# Patient Record
Sex: FEMALE | Race: WHITE
[De-identification: names, ages, dates, MRNs, and addresses within clinical notes are randomized per-mention and may not be internally consistent; named-entity substitution may affect disease eponyms.]

---

## 2017-09-15 ENCOUNTER — HOSPITAL ENCOUNTER (OUTPATIENT)
Dept: HOSPITAL 53 - M RAD | Age: 82
End: 2017-09-15
Attending: SURGERY
Payer: MEDICARE

## 2017-09-15 DIAGNOSIS — I65.23: Primary | ICD-10-CM

## 2017-09-15 NOTE — REP
CAROTID DUPLEX ULTRASOUND:  09/15/2017

 

COMPARISON.    07/03/2012

 

CLINICAL HISTORY:  Carotid stenosis.

 

Standard duplex techniques were utilized to evaluate the carotid systems

bilaterally.  There is only minimal intimal thickening in the right common

carotid artery and some anterior and posterior plaque at the bulb and into the

proximal ICA and ECA noted, but mild.

 

The left common carotid also shows some intimal thickening with anterior and

posterior plaque at the bulb and extending into the ICA and ECA with both

calcific and soft plaque noted.

 

Peak Velocities:

 

                                 RIGHT          LEFT

 

CCA systolic         0.86               0.85 m/s

 

ICA systolic          0.95                0.60 m/s

 

ICA diastolic        0.25                0.18 m/s

 

ECA systolic           1.05               0.94 m/s

 

IC/CC ratio           1.10               0.70.

 

Cranial direction of flow is seen in both vertebral arteries.  The Doppler

waveform analysis shows no significant spectral broadening or filling in of the

systolic window.

 

IMPRESSION:

 

1.  Bilateral moderate carotid disease, less than 50% stenosis in the proximal

right and left ICA.  Soft and calcific plaque identified at the bulb, proximal

ICA.  Significant progression.  No hemodynamically significant or flow

restricting lesion.

 

2.  Cranial direction of flow vertebral arteries.

 

 

Signed by

José Torrez MD 09/15/2017 03:50 P

## 2017-09-21 ENCOUNTER — HOSPITAL ENCOUNTER (OUTPATIENT)
Dept: HOSPITAL 53 - M OPP | Age: 82
Discharge: HOME | End: 2017-09-21
Attending: SURGERY
Payer: MEDICARE

## 2017-09-21 VITALS — BODY MASS INDEX: 27 KG/M2 | WEIGHT: 168 LBS | HEIGHT: 66 IN

## 2017-09-21 VITALS — SYSTOLIC BLOOD PRESSURE: 135 MMHG | DIASTOLIC BLOOD PRESSURE: 67 MMHG

## 2017-09-21 DIAGNOSIS — G47.00: ICD-10-CM

## 2017-09-21 DIAGNOSIS — Z96.652: ICD-10-CM

## 2017-09-21 DIAGNOSIS — K64.4: ICD-10-CM

## 2017-09-21 DIAGNOSIS — M19.90: ICD-10-CM

## 2017-09-21 DIAGNOSIS — D12.5: ICD-10-CM

## 2017-09-21 DIAGNOSIS — Z12.11: Primary | ICD-10-CM

## 2017-09-21 DIAGNOSIS — J45.909: ICD-10-CM

## 2017-09-21 DIAGNOSIS — I65.23: ICD-10-CM

## 2017-09-21 DIAGNOSIS — M35.00: ICD-10-CM

## 2017-09-21 DIAGNOSIS — K57.30: ICD-10-CM

## 2017-09-21 DIAGNOSIS — Z80.1: ICD-10-CM

## 2017-09-21 DIAGNOSIS — Z79.899: ICD-10-CM

## 2017-09-21 DIAGNOSIS — D12.4: ICD-10-CM

## 2017-09-21 DIAGNOSIS — K64.8: ICD-10-CM

## 2017-09-21 DIAGNOSIS — I10: ICD-10-CM

## 2017-09-21 DIAGNOSIS — D12.3: ICD-10-CM

## 2017-09-21 DIAGNOSIS — Z80.42: ICD-10-CM

## 2017-09-21 DIAGNOSIS — Z80.52: ICD-10-CM

## 2017-09-21 DIAGNOSIS — R21: ICD-10-CM

## 2017-09-21 DIAGNOSIS — E78.5: ICD-10-CM

## 2017-09-21 DIAGNOSIS — Z80.0: ICD-10-CM

## 2017-09-21 DIAGNOSIS — Z86.010: ICD-10-CM

## 2017-09-21 NOTE — ROOR
________________________________________________________________________________

Patient Name: Abby Heard             Procedure Date: 9/21/2017 11:38 AM

MRN: [id number]                          Account Number: [id number]

YOB: 1935               Age: 82

Room: [facility]                            Gender: Female

Note Status: Finalized                 

________________________________________________________________________________

 

Procedure:           Colonoscopy

Indications:         High risk colon cancer surveillance: Personal history of 

                     colonic polyps

Providers:           Leo J. Gosselin Jr, MD

Referring MD:        REMY ROBISON MD

Requesting Provider: 

Medicines:           Propofol per Anesthesia

Complications:       No immediate complications.

________________________________________________________________________________

Procedure:           Pre-Anesthesia Assessment:

                     - Prior to the procedure, a History and Physical was 

                     performed, and patient medications and allergies were 

                     reviewed. The patient is competent. The risks and 

                     benefits of the procedure and the sedation options and 

                     risks were discussed with the patient. All questions were 

                     answered and informed consent was obtained. Patient 

                     identification and proposed procedure were verified by 

                     the physician and the nurse in the pre-procedure area and 

                     in the procedure room. Mental Status Examination: alert 

                     and oriented. Airway Examination: normal oropharyngeal 

                     airway and neck mobility. Respiratory Examination: clear 

                     to auscultation. CV Examination: normal. ASA Grade 

                     Assessment: II - A patient with mild systemic disease. 

                     After reviewing the risks and benefits, the patient was 

                     deemed in satisfactory condition to undergo the 

                     procedure. The anesthesia plan was to use moderate 

                     sedation / analgesia (conscious sedation). Immediately 

                     prior to administration of medications, the patient was 

                     re-assessed for adequacy to receive sedatives. The heart 

                     rate, respiratory rate, oxygen saturations, blood 

                     pressure, adequacy of pulmonary ventilation, and response 

                     to care were monitored throughout the procedure. The 

                     physical status of the patient was re-assessed after the 

                     procedure.

                     The Colonoscope was introduced through the anus and 

                     advanced to the cecum, identified by appendiceal orifice 

                     and ileocecal valve. The colonoscopy was performed 

                     without difficulty. The patient tolerated the procedure 

                     well. The quality of the bowel preparation was fair.

                                                                                

Findings:

     The perianal exam findings include non-thrombosed external hemorrhoids 

     and non-thrombosed internal hemorrhoids.

     Many small and large-mouthed diverticula were found in the sigmoid colon.

     Three sessile polyps were found in the sigmoid colon, descending colon 

     and transverse colon. The polyps were small in size.

     The rectum, recto-sigmoid colon, ascending colon and cecum appeared 

     normal.

                                                                                

Impression:          - Preparation of the colon was fair.

                     - Non-thrombosed external hemorrhoids and non-thrombosed 

                     internal hemorrhoids found on perianal exam.

                     - Diverticulosis in the sigmoid colon.

                     - Three small polyps in the sigmoid colon, in the 

                     descending colon and in the transverse colon.

                     - The rectum, recto-sigmoid colon, ascending colon and 

                     cecum are normal.

                     - No specimens collected.

Recommendation:      - Discharge patient to home (ambulatory).

                     - Repeat colonoscopy in 5 years for surveillance.

                                                                                

 

Leo Gosselin MD

_____________________

Leo J Gosselin Jr, MD

9/21/2017 12:11:11 PM

This report has been signed electronically.

Number of Addenda: 0

 

Note Initiated On: 9/21/2017 11:38 AM

Estimated Blood Loss:

     Estimated blood loss: none.

## 2020-08-21 ENCOUNTER — HOSPITAL ENCOUNTER (OUTPATIENT)
Dept: HOSPITAL 53 - M RAD | Age: 85
End: 2020-08-21
Attending: INTERNAL MEDICINE
Payer: MEDICARE

## 2020-08-21 DIAGNOSIS — I65.8: Primary | ICD-10-CM

## 2020-08-28 NOTE — REP
BILATERAL CAROTID DUPLEX ULTRASOUND 

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



FINDINGS: 

There is moderate atheromatous plaque in the bulbs bilaterally. 



 





PEAK FLOW VELOCITY ANALYSIS RIGHT LEFT

 

Peak ICA velocity  83.8 cm/s 75.1 cm/s

 

Diastolic ICA velocity  20.6 cm/s  18.6 cm/s 

 

ICA/CCA ratio 0.87 0.93

 

Peak ECA flow velocity  101.4 cm/s  82.6 cm/s 

 

Peak CCA flow velocity  96.3 cm/s  80.8 cm/s 





 Peak flow velocities are normal bilaterally. There is no significant stenosis 
on the right or the left. 



There is antegrade flow in the vertebral arteries bilaterally. 

MTDD

## 2022-05-10 ENCOUNTER — HOSPITAL ENCOUNTER (INPATIENT)
Dept: HOSPITAL 53 - M ED | Age: 87
LOS: 3 days | Discharge: HOME HEALTH | DRG: 689 | End: 2022-05-13
Attending: INTERNAL MEDICINE | Admitting: INTERNAL MEDICINE
Payer: MEDICARE

## 2022-05-10 VITALS — BODY MASS INDEX: 23.56 KG/M2 | HEIGHT: 66 IN | WEIGHT: 146.61 LBS

## 2022-05-10 DIAGNOSIS — Z79.899: ICD-10-CM

## 2022-05-10 DIAGNOSIS — M32.9: ICD-10-CM

## 2022-05-10 DIAGNOSIS — B96.20: ICD-10-CM

## 2022-05-10 DIAGNOSIS — Z79.82: ICD-10-CM

## 2022-05-10 DIAGNOSIS — J09.X2: ICD-10-CM

## 2022-05-10 DIAGNOSIS — Z88.2: ICD-10-CM

## 2022-05-10 DIAGNOSIS — E78.5: ICD-10-CM

## 2022-05-10 DIAGNOSIS — K21.9: ICD-10-CM

## 2022-05-10 DIAGNOSIS — Z88.1: ICD-10-CM

## 2022-05-10 DIAGNOSIS — N39.0: Primary | ICD-10-CM

## 2022-05-10 DIAGNOSIS — I10: ICD-10-CM

## 2022-05-10 DIAGNOSIS — G93.41: ICD-10-CM

## 2022-05-10 DIAGNOSIS — Z20.822: ICD-10-CM

## 2022-05-10 DIAGNOSIS — E87.6: ICD-10-CM

## 2022-05-10 DIAGNOSIS — J45.909: ICD-10-CM

## 2022-05-10 DIAGNOSIS — Z90.49: ICD-10-CM

## 2022-05-10 DIAGNOSIS — E83.42: ICD-10-CM

## 2022-05-10 DIAGNOSIS — M35.00: ICD-10-CM

## 2022-05-10 DIAGNOSIS — Z96.652: ICD-10-CM

## 2022-05-10 DIAGNOSIS — Z66: ICD-10-CM

## 2022-05-10 DIAGNOSIS — Z90.79: ICD-10-CM

## 2022-05-10 DIAGNOSIS — R50.9: ICD-10-CM

## 2022-05-10 LAB
ALBUMIN SERPL BCG-MCNC: 2.7 GM/DL (ref 3.2–5.2)
ALT SERPL W P-5'-P-CCNC: 19 U/L (ref 12–78)
BASOPHILS # BLD AUTO: 0 10^3/UL (ref 0–0.2)
BASOPHILS NFR BLD AUTO: 0.1 % (ref 0–1)
BILIRUB CONJ SERPL-MCNC: 0.1 MG/DL (ref 0–0.2)
BILIRUB SERPL-MCNC: 0.3 MG/DL (ref 0.2–1)
BUN SERPL-MCNC: 15 MG/DL (ref 7–18)
CALCIUM SERPL-MCNC: 8 MG/DL (ref 8.8–10.2)
CHLORIDE SERPL-SCNC: 101 MEQ/L (ref 98–107)
CO2 SERPL-SCNC: 29 MEQ/L (ref 21–32)
CREAT SERPL-MCNC: 1.05 MG/DL (ref 0.55–1.3)
EOSINOPHIL # BLD AUTO: 0 10^3/UL (ref 0–0.5)
EOSINOPHIL NFR BLD AUTO: 0.3 % (ref 0–3)
GFR SERPL CREATININE-BSD FRML MDRD: 53 ML/MIN/{1.73_M2} (ref 32–?)
GLUCOSE SERPL-MCNC: 118 MG/DL (ref 70–100)
HCT VFR BLD AUTO: 31.2 % (ref 36–47)
HGB BLD-MCNC: 10.7 G/DL (ref 12–15.5)
LIPASE SERPL-CCNC: 64 U/L (ref 73–393)
LYMPHOCYTES # BLD AUTO: 0.4 10^3/UL (ref 1.5–5)
LYMPHOCYTES NFR BLD AUTO: 4.2 % (ref 24–44)
MCH RBC QN AUTO: 30.7 PG (ref 27–33)
MCHC RBC AUTO-ENTMCNC: 34.3 G/DL (ref 32–36.5)
MCV RBC AUTO: 89.4 FL (ref 80–96)
MONOCYTES # BLD AUTO: 0.7 10^3/UL (ref 0–0.8)
MONOCYTES NFR BLD AUTO: 7.5 % (ref 2–8)
NEUTROPHILS # BLD AUTO: 8.1 10^3/UL (ref 1.5–8.5)
NEUTROPHILS NFR BLD AUTO: 87.4 % (ref 36–66)
PLATELET # BLD AUTO: 188 10^3/UL (ref 150–450)
POTASSIUM SERPL-SCNC: 2.9 MEQ/L (ref 3.5–5.1)
PROT SERPL-MCNC: 6.3 GM/DL (ref 6.4–8.2)
RBC # BLD AUTO: 3.49 10^6/UL (ref 4–5.4)
SODIUM SERPL-SCNC: 135 MEQ/L (ref 136–145)
WBC # BLD AUTO: 9.3 10^3/UL (ref 4–10)

## 2022-05-11 VITALS — OXYGEN SATURATION: 96 %

## 2022-05-11 VITALS — SYSTOLIC BLOOD PRESSURE: 88 MMHG | DIASTOLIC BLOOD PRESSURE: 51 MMHG

## 2022-05-11 VITALS — OXYGEN SATURATION: 95 %

## 2022-05-11 VITALS — OXYGEN SATURATION: 97 % | DIASTOLIC BLOOD PRESSURE: 41 MMHG | SYSTOLIC BLOOD PRESSURE: 91 MMHG

## 2022-05-11 VITALS — DIASTOLIC BLOOD PRESSURE: 62 MMHG | SYSTOLIC BLOOD PRESSURE: 132 MMHG

## 2022-05-11 VITALS — OXYGEN SATURATION: 98 % | DIASTOLIC BLOOD PRESSURE: 52 MMHG | SYSTOLIC BLOOD PRESSURE: 110 MMHG

## 2022-05-11 VITALS — OXYGEN SATURATION: 94 %

## 2022-05-11 VITALS — SYSTOLIC BLOOD PRESSURE: 98 MMHG | DIASTOLIC BLOOD PRESSURE: 50 MMHG

## 2022-05-11 VITALS — OXYGEN SATURATION: 93 %

## 2022-05-11 VITALS — SYSTOLIC BLOOD PRESSURE: 140 MMHG | DIASTOLIC BLOOD PRESSURE: 60 MMHG

## 2022-05-11 VITALS — SYSTOLIC BLOOD PRESSURE: 131 MMHG | OXYGEN SATURATION: 94 % | DIASTOLIC BLOOD PRESSURE: 58 MMHG

## 2022-05-11 VITALS — DIASTOLIC BLOOD PRESSURE: 65 MMHG | SYSTOLIC BLOOD PRESSURE: 106 MMHG

## 2022-05-11 VITALS — OXYGEN SATURATION: 97 %

## 2022-05-11 LAB
APTT BLD: 32.2 SECONDS (ref 25.9–37)
BUN SERPL-MCNC: 19 MG/DL (ref 7–18)
CALCIUM SERPL-MCNC: 8.1 MG/DL (ref 8.8–10.2)
CHLORIDE SERPL-SCNC: 101 MEQ/L (ref 98–107)
CO2 SERPL-SCNC: 26 MEQ/L (ref 21–32)
CREAT SERPL-MCNC: 1.17 MG/DL (ref 0.55–1.3)
GFR SERPL CREATININE-BSD FRML MDRD: 46.8 ML/MIN/{1.73_M2} (ref 32–?)
GLUCOSE SERPL-MCNC: 150 MG/DL (ref 70–100)
HCT VFR BLD AUTO: 36.2 % (ref 36–47)
HGB BLD-MCNC: 12.4 G/DL (ref 12–15.5)
INR PPP: 0.99
MAGNESIUM SERPL-MCNC: 2.1 MG/DL (ref 1.8–2.4)
MCH RBC QN AUTO: 31 PG (ref 27–33)
MCHC RBC AUTO-ENTMCNC: 34.3 G/DL (ref 32–36.5)
MCV RBC AUTO: 90.5 FL (ref 80–96)
PLATELET # BLD AUTO: 219 10^3/UL (ref 150–450)
POTASSIUM SERPL-SCNC: 3.3 MEQ/L (ref 3.5–5.1)
PROTHROMBIN TIME: 13.5 SECONDS (ref 12.7–14.5)
RBC # BLD AUTO: 4 10^6/UL (ref 4–5.4)
SODIUM SERPL-SCNC: 135 MEQ/L (ref 136–145)
WBC # BLD AUTO: 12.1 10^3/UL (ref 4–10)

## 2022-05-11 RX ADMIN — FLUTICASONE PROPIONATE AND SALMETEROL XINAFOATE SCH PUFF: 115; 21 AEROSOL, METERED RESPIRATORY (INHALATION) at 19:33

## 2022-05-11 RX ADMIN — ENOXAPARIN SODIUM SCH MG: 40 INJECTION SUBCUTANEOUS at 10:18

## 2022-05-11 RX ADMIN — CEFTRIAXONE SCH MLS/HR: 1 INJECTION, POWDER, FOR SOLUTION INTRAMUSCULAR; INTRAVENOUS at 20:34

## 2022-05-11 RX ADMIN — POTASSIUM CHLORIDE AND SODIUM CHLORIDE SCH MLS/HR: 900; 300 INJECTION, SOLUTION INTRAVENOUS at 01:43

## 2022-05-11 RX ADMIN — ACETAMINOPHEN PRN MG: 325 TABLET ORAL at 15:53

## 2022-05-11 RX ADMIN — ASPIRIN SCH MG: 81 TABLET ORAL at 20:34

## 2022-05-11 RX ADMIN — OSELTAMIVIR PHOSPHATE SCH MG: 30 CAPSULE ORAL at 08:00

## 2022-05-11 RX ADMIN — SIMVASTATIN SCH MG: 20 TABLET, FILM COATED ORAL at 20:34

## 2022-05-11 RX ADMIN — OSELTAMIVIR PHOSPHATE SCH MG: 30 CAPSULE ORAL at 20:34

## 2022-05-11 RX ADMIN — POTASSIUM CHLORIDE AND SODIUM CHLORIDE SCH MLS/HR: 900; 300 INJECTION, SOLUTION INTRAVENOUS at 10:18

## 2022-05-11 RX ADMIN — FLUTICASONE PROPIONATE AND SALMETEROL XINAFOATE SCH PUFF: 115; 21 AEROSOL, METERED RESPIRATORY (INHALATION) at 18:13

## 2022-05-12 VITALS — OXYGEN SATURATION: 92 %

## 2022-05-12 VITALS — SYSTOLIC BLOOD PRESSURE: 129 MMHG | DIASTOLIC BLOOD PRESSURE: 59 MMHG

## 2022-05-12 VITALS — OXYGEN SATURATION: 95 % | SYSTOLIC BLOOD PRESSURE: 120 MMHG | DIASTOLIC BLOOD PRESSURE: 59 MMHG

## 2022-05-12 VITALS — SYSTOLIC BLOOD PRESSURE: 119 MMHG | DIASTOLIC BLOOD PRESSURE: 69 MMHG

## 2022-05-12 VITALS — OXYGEN SATURATION: 95 %

## 2022-05-12 VITALS — DIASTOLIC BLOOD PRESSURE: 59 MMHG | SYSTOLIC BLOOD PRESSURE: 120 MMHG

## 2022-05-12 VITALS — DIASTOLIC BLOOD PRESSURE: 60 MMHG | SYSTOLIC BLOOD PRESSURE: 130 MMHG

## 2022-05-12 VITALS — OXYGEN SATURATION: 96 %

## 2022-05-12 LAB
BUN SERPL-MCNC: 15 MG/DL (ref 7–18)
CALCIUM SERPL-MCNC: 7.5 MG/DL (ref 8.8–10.2)
CHLORIDE SERPL-SCNC: 109 MEQ/L (ref 98–107)
CO2 SERPL-SCNC: 25 MEQ/L (ref 21–32)
CREAT SERPL-MCNC: 0.89 MG/DL (ref 0.55–1.3)
GFR SERPL CREATININE-BSD FRML MDRD: > 60 ML/MIN/{1.73_M2} (ref 32–?)
GLUCOSE SERPL-MCNC: 98 MG/DL (ref 70–100)
HCT VFR BLD AUTO: 29.2 % (ref 36–47)
HGB BLD-MCNC: 10 G/DL (ref 12–15.5)
MAGNESIUM SERPL-MCNC: 1.7 MG/DL (ref 1.8–2.4)
MCH RBC QN AUTO: 29.8 PG (ref 27–33)
MCHC RBC AUTO-ENTMCNC: 34.2 G/DL (ref 32–36.5)
MCV RBC AUTO: 86.9 FL (ref 80–96)
PLATELET # BLD AUTO: 181 10^3/UL (ref 150–450)
POTASSIUM SERPL-SCNC: 3.1 MEQ/L (ref 3.5–5.1)
RBC # BLD AUTO: 3.36 10^6/UL (ref 4–5.4)
SODIUM SERPL-SCNC: 140 MEQ/L (ref 136–145)
WBC # BLD AUTO: 11.7 10^3/UL (ref 4–10)

## 2022-05-12 RX ADMIN — OMEPRAZOLE SCH MG: 20 CAPSULE, DELAYED RELEASE ORAL at 08:52

## 2022-05-12 RX ADMIN — ASPIRIN SCH MG: 81 TABLET ORAL at 20:06

## 2022-05-12 RX ADMIN — LORATADINE SCH MG: 10 TABLET ORAL at 08:52

## 2022-05-12 RX ADMIN — ENOXAPARIN SODIUM SCH MG: 40 INJECTION SUBCUTANEOUS at 08:52

## 2022-05-12 RX ADMIN — SIMVASTATIN SCH MG: 20 TABLET, FILM COATED ORAL at 20:06

## 2022-05-12 RX ADMIN — FLUTICASONE PROPIONATE AND SALMETEROL XINAFOATE SCH PUFF: 115; 21 AEROSOL, METERED RESPIRATORY (INHALATION) at 07:38

## 2022-05-12 RX ADMIN — ACETAMINOPHEN PRN MG: 325 TABLET ORAL at 16:16

## 2022-05-12 RX ADMIN — FLUTICASONE PROPIONATE AND SALMETEROL XINAFOATE SCH PUFF: 115; 21 AEROSOL, METERED RESPIRATORY (INHALATION) at 19:14

## 2022-05-12 RX ADMIN — ACETAMINOPHEN PRN MG: 325 TABLET ORAL at 02:44

## 2022-05-12 RX ADMIN — METOPROLOL SUCCINATE SCH MG: 25 TABLET, EXTENDED RELEASE ORAL at 08:46

## 2022-05-12 RX ADMIN — OSELTAMIVIR PHOSPHATE SCH MG: 30 CAPSULE ORAL at 20:06

## 2022-05-12 RX ADMIN — OSELTAMIVIR PHOSPHATE SCH MG: 30 CAPSULE ORAL at 08:52

## 2022-05-12 RX ADMIN — ACETAMINOPHEN PRN MG: 325 TABLET ORAL at 07:41

## 2022-05-12 RX ADMIN — CEFTRIAXONE SCH MLS/HR: 1 INJECTION, POWDER, FOR SOLUTION INTRAMUSCULAR; INTRAVENOUS at 20:06

## 2022-05-13 VITALS — DIASTOLIC BLOOD PRESSURE: 61 MMHG | SYSTOLIC BLOOD PRESSURE: 128 MMHG

## 2022-05-13 VITALS — DIASTOLIC BLOOD PRESSURE: 60 MMHG | SYSTOLIC BLOOD PRESSURE: 134 MMHG

## 2022-05-13 LAB
BUN SERPL-MCNC: 8 MG/DL (ref 7–18)
CALCIUM SERPL-MCNC: 7.6 MG/DL (ref 8.8–10.2)
CHLORIDE SERPL-SCNC: 110 MEQ/L (ref 98–107)
CO2 SERPL-SCNC: 26 MEQ/L (ref 21–32)
CREAT SERPL-MCNC: 0.74 MG/DL (ref 0.55–1.3)
GFR SERPL CREATININE-BSD FRML MDRD: > 60 ML/MIN/{1.73_M2} (ref 32–?)
GLUCOSE SERPL-MCNC: 96 MG/DL (ref 70–100)
HCT VFR BLD AUTO: 30.5 % (ref 36–47)
HGB BLD-MCNC: 10.4 G/DL (ref 12–15.5)
MAGNESIUM SERPL-MCNC: 2 MG/DL (ref 1.8–2.4)
MCH RBC QN AUTO: 30.5 PG (ref 27–33)
MCHC RBC AUTO-ENTMCNC: 34.1 G/DL (ref 32–36.5)
MCV RBC AUTO: 89.4 FL (ref 80–96)
PLATELET # BLD AUTO: 193 10^3/UL (ref 150–450)
POTASSIUM SERPL-SCNC: 3.3 MEQ/L (ref 3.5–5.1)
RBC # BLD AUTO: 3.41 10^6/UL (ref 4–5.4)
SODIUM SERPL-SCNC: 140 MEQ/L (ref 136–145)
WBC # BLD AUTO: 6.5 10^3/UL (ref 4–10)

## 2022-05-13 RX ADMIN — ENOXAPARIN SODIUM SCH MG: 40 INJECTION SUBCUTANEOUS at 08:14

## 2022-05-13 RX ADMIN — ACETAMINOPHEN PRN MG: 325 TABLET ORAL at 01:20

## 2022-05-13 RX ADMIN — FLUTICASONE PROPIONATE AND SALMETEROL XINAFOATE SCH PUFF: 115; 21 AEROSOL, METERED RESPIRATORY (INHALATION) at 07:47

## 2022-05-13 RX ADMIN — LORATADINE SCH MG: 10 TABLET ORAL at 08:13

## 2022-05-13 RX ADMIN — OSELTAMIVIR PHOSPHATE SCH MG: 30 CAPSULE ORAL at 08:13

## 2022-05-13 RX ADMIN — METOPROLOL SUCCINATE SCH MG: 25 TABLET, EXTENDED RELEASE ORAL at 08:13

## 2022-05-13 RX ADMIN — OMEPRAZOLE SCH MG: 20 CAPSULE, DELAYED RELEASE ORAL at 08:13

## 2022-06-17 ENCOUNTER — HOSPITAL ENCOUNTER (OUTPATIENT)
Dept: HOSPITAL 53 - M PLALAB | Age: 87
End: 2022-06-17
Attending: INTERNAL MEDICINE
Payer: MEDICARE

## 2022-06-17 DIAGNOSIS — M35.01: Primary | ICD-10-CM

## 2022-06-17 LAB
BASOPHILS # BLD AUTO: 0 10^3/UL (ref 0–0.2)
BASOPHILS NFR BLD AUTO: 0.2 % (ref 0–1)
EOSINOPHIL # BLD AUTO: 0.2 10^3/UL (ref 0–0.5)
EOSINOPHIL NFR BLD AUTO: 2.2 % (ref 0–3)
HCT VFR BLD AUTO: 37 % (ref 36–47)
HGB BLD-MCNC: 12.5 G/DL (ref 12–15.5)
LYMPHOCYTES # BLD AUTO: 1.2 10^3/UL (ref 1.5–5)
LYMPHOCYTES NFR BLD AUTO: 13.3 % (ref 24–44)
MCH RBC QN AUTO: 31.1 PG (ref 27–33)
MCHC RBC AUTO-ENTMCNC: 33.8 G/DL (ref 32–36.5)
MCV RBC AUTO: 92 FL (ref 80–96)
MONOCYTES # BLD AUTO: 1.2 10^3/UL (ref 0–0.8)
MONOCYTES NFR BLD AUTO: 13.7 % (ref 2–8)
NEUTROPHILS # BLD AUTO: 6.3 10^3/UL (ref 1.5–8.5)
NEUTROPHILS NFR BLD AUTO: 70 % (ref 36–66)
PLATELET # BLD AUTO: 334 10^3/UL (ref 150–450)
RBC # BLD AUTO: 4.02 10^6/UL (ref 4–5.4)
WBC # BLD AUTO: 9 10^3/UL (ref 4–10)

## 2023-08-15 ENCOUNTER — HOSPITAL ENCOUNTER (OUTPATIENT)
Dept: HOSPITAL 53 - M WUC | Age: 88
End: 2023-08-15
Attending: INTERNAL MEDICINE
Payer: MEDICARE

## 2023-08-15 DIAGNOSIS — M32.9: Primary | ICD-10-CM

## 2023-08-15 LAB
ALBUMIN SERPL BCG-MCNC: 3.3 G/DL (ref 3.2–5.2)
ALP SERPL-CCNC: 91 U/L (ref 46–116)
ALT SERPL W P-5'-P-CCNC: 15 U/L (ref 7–40)
AST SERPL-CCNC: 15 U/L (ref ?–34)
BASOPHILS # BLD AUTO: 0 10^3/UL (ref 0–0.2)
BASOPHILS NFR BLD AUTO: 0.1 % (ref 0–1)
BILIRUB SERPL-MCNC: 0.7 MG/DL (ref 0.3–1.2)
BUN SERPL-MCNC: 18 MG/DL (ref 9–23)
CALCIUM SERPL-MCNC: 9.1 MG/DL (ref 8.3–10.6)
CHLORIDE SERPL-SCNC: 102 MMOL/L (ref 98–107)
CO2 SERPL-SCNC: 31 MMOL/L (ref 20–31)
CREAT SERPL-MCNC: 0.9 MG/DL (ref 0.55–1.3)
EOSINOPHIL # BLD AUTO: 0.2 10^3/UL (ref 0–0.5)
EOSINOPHIL NFR BLD AUTO: 2.7 % (ref 0–3)
GFR SERPL CREATININE-BSD FRML MDRD: > 60 ML/MIN/{1.73_M2} (ref 32–?)
GLUCOSE SERPL-MCNC: 97 MG/DL (ref 74–106)
HCT VFR BLD AUTO: 36.1 % (ref 36–47)
HGB BLD-MCNC: 12 G/DL (ref 12–15.5)
LYMPHOCYTES # BLD AUTO: 1.5 10^3/UL (ref 1.5–5)
LYMPHOCYTES NFR BLD AUTO: 19.6 % (ref 24–44)
MCH RBC QN AUTO: 32.1 PG (ref 27–33)
MCHC RBC AUTO-ENTMCNC: 33.2 G/DL (ref 32–36.5)
MCV RBC AUTO: 96.5 FL (ref 80–96)
MONOCYTES # BLD AUTO: 1.2 10^3/UL (ref 0–0.8)
MONOCYTES NFR BLD AUTO: 15.8 % (ref 2–8)
NEUTROPHILS # BLD AUTO: 4.6 10^3/UL (ref 1.5–8.5)
NEUTROPHILS NFR BLD AUTO: 61.5 % (ref 36–66)
PLATELET # BLD AUTO: 245 10^3/UL (ref 150–450)
POTASSIUM SERPL-SCNC: 3.3 MMOL/L (ref 3.5–5.1)
PROT SERPL-MCNC: 6.9 G/DL (ref 5.7–8.2)
RBC # BLD AUTO: 3.74 10^6/UL (ref 4–5.4)
SODIUM SERPL-SCNC: 141 MMOL/L (ref 136–145)
WBC # BLD AUTO: 7.4 10^3/UL (ref 4–10)